# Patient Record
Sex: FEMALE | Race: OTHER | NOT HISPANIC OR LATINO
[De-identification: names, ages, dates, MRNs, and addresses within clinical notes are randomized per-mention and may not be internally consistent; named-entity substitution may affect disease eponyms.]

---
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
## 2022-07-27 NOTE — ED PEDIATRIC TRIAGE NOTE - BSA (M2)
Caller: Carol Preciado    Relationship: Self    Best call back number: [phone]    Requested Prescriptions:   Requested Prescriptions     Pending Prescriptions Disp Refills   • amphetamine-dextroamphetamine (Adderall) 10 MG tablet 84 tablet 0     Sig: Take 1 tablet by mouth 3 (Three) Times a Day.        Pharmacy where request should be sent: [id number] - [place], [place] - [address] [facility] [address] [facility] [phone] [facility] [phone] FX     Additional details provided by patient: PATIENT IS OUT OF MEDICATION    Does the patient have less than a 3 day supply:  [x] Yes  [] No    Rigoberto Ramsey Rep   07/27/22 15:58 EDT             
1.71

## 2023-06-21 NOTE — ED PEDIATRIC TRIAGE NOTE - HEIGHT/LENGTH IN CM
Patient seen and examined at bedside.    --Anticoagulation--    T(C): 36.7 (06-20-23 @ 23:00), Max: 36.9 (06-20-23 @ 07:00)  HR: 72 (06-20-23 @ 23:00) (64 - 93)  BP: 131/64 (06-20-23 @ 23:00) (125/78 - 138/82)  RR: 15 (06-20-23 @ 23:00) (12 - 16)  SpO2: 98% (06-20-23 @ 23:00) (98% - 100%)  Wt(kg): --     Exam: AOx3, EOMI, no facial/drift, KING 5/5, SILT    876